# Patient Record
Sex: FEMALE | Race: WHITE | NOT HISPANIC OR LATINO | URBAN - METROPOLITAN AREA
[De-identification: names, ages, dates, MRNs, and addresses within clinical notes are randomized per-mention and may not be internally consistent; named-entity substitution may affect disease eponyms.]

---

## 2017-12-15 ENCOUNTER — OFFICE VISIT (OUTPATIENT)
Dept: ORTHOPEDIC SURGERY | Facility: CLINIC | Age: 42
End: 2017-12-15

## 2017-12-15 VITALS
WEIGHT: 165 LBS | SYSTOLIC BLOOD PRESSURE: 134 MMHG | BODY MASS INDEX: 31.15 KG/M2 | HEART RATE: 67 BPM | HEIGHT: 61 IN | DIASTOLIC BLOOD PRESSURE: 86 MMHG

## 2017-12-15 DIAGNOSIS — M76.61 RIGHT ACHILLES TENDINITIS: Primary | ICD-10-CM

## 2017-12-15 DIAGNOSIS — M72.2 PLANTAR FASCIAL FIBROMATOSIS OF LEFT FOOT: ICD-10-CM

## 2017-12-15 PROCEDURE — 99203 OFFICE O/P NEW LOW 30 MIN: CPT | Performed by: PHYSICIAN ASSISTANT

## 2017-12-15 RX ORDER — NORETHINDRONE, ETHINYL ESTRADIOL, AND FERROUS FUMARATE 0.8-25(24)
KIT ORAL
COMMUNITY
Start: 2017-10-12

## 2017-12-15 RX ORDER — TRIAMTERENE AND HYDROCHLOROTHIAZIDE 37.5; 25 MG/1; MG/1
TABLET ORAL
COMMUNITY
Start: 2017-11-17

## 2017-12-15 RX ORDER — MONTELUKAST SODIUM 10 MG/1
TABLET ORAL
Refills: 3 | COMMUNITY
Start: 2017-11-26

## 2017-12-15 RX ORDER — ESCITALOPRAM OXALATE 10 MG/1
TABLET ORAL
COMMUNITY
Start: 2017-11-17

## 2017-12-15 RX ORDER — DEXLANSOPRAZOLE 60 MG/1
CAPSULE, DELAYED RELEASE ORAL
COMMUNITY
Start: 2017-10-11

## 2017-12-15 RX ORDER — BUTALBITAL AND ACETAMINOPHEN 300; 50 MG/1; MG/1
TABLET ORAL
Refills: 2 | COMMUNITY
Start: 2017-11-09

## 2017-12-15 RX ORDER — AZITHROMYCIN 250 MG/1
TABLET, FILM COATED ORAL
Refills: 1 | COMMUNITY
Start: 2017-09-27

## 2017-12-15 NOTE — PROGRESS NOTES
[facility] Note    Subjective     Patient: Nora Becerra  : 1975    Primary Care Provider: Obed Adler MD    Requesting Provider: As above    Pain of the Left Foot and Pain of the Right Foot      History    Chief Complaint: Right achilles pain and left arch pain    History of Present Illness: This is a very pleasant 43 yo female presenting with 2 year history of right achilles tendon pain.  She denies any trauma or injury.  She complains of sharp and burning pain with weight bearing, no rest pain or night pain.  She denies any radiating pain.  She rates the pain 8/10.  She denies any warmth or erythema.  Pain is worse first thing in the morning and after getting up from a seated position.  She has treated with icing and compression sleeves and intermittent stretching.    She also complains of a small, painful nodule on the plantar aspect of her right foot.  She reports pain with pressure over the nodule.  No redness, warmth, erythema.  It is intermittently painful with weight bearing.  No rest pain or night pain.  Pain is 6/10.    No current outpatient prescriptions on file prior to visit.     No current facility-administered medications on file prior to visit.       No Known Allergies   Past Medical History:   Diagnosis Date   • Hypertension      History reviewed. No pertinent surgical history.  Family History   Problem Relation Age of Onset   • Family history unknown: Yes      Social History     Social History   • Marital status: Unknown     Spouse name: N/A   • Number of children: N/A   • Years of education: N/A     Occupational History   • Not on file.     Social History Main Topics   • Smoking status: Never Smoker   • Smokeless tobacco: Never Used   • Alcohol use No   • Drug use: Defer   • Sexual activity: Defer     Other Topics Concern   • Not on file     Social History Narrative   • No narrative on file        Review of Systems   Constitutional: Negative.    HENT:  "Negative.    Eyes: Negative.    Respiratory: Negative.    Cardiovascular: Negative.    Gastrointestinal: Negative.    Endocrine: Negative.    Genitourinary: Negative.    Musculoskeletal: Negative.         Foot Pain    Skin: Negative.    Allergic/Immunologic: Negative.    Neurological: Negative.    Hematological: Negative.    Psychiatric/Behavioral: Negative.        The following portions of the patient's history were reviewed and updated as appropriate: allergies, current medications, past family history, past medical history, past social history, past surgical history and problem list.      Objective      Physical Exam  /86  Pulse 67  Ht 154.9 cm (61\")  Wt 74.8 kg (165 lb)  BMI 31.18 kg/m2    Body mass index is 31.18 kg/(m^2).    GENERAL: Body habitus: obese    Lower extremity edema: Left: trace; Right: trace    Varicose veins:  Left: none; Right: none    Gait: normal     Mental Status:  awake and alert; oriented to person, place, and time    Voice:  clear  SKIN:  Normal    Hair Growth:  Right:normal; Left:  normal  HEENT: Head: Normocephalic, atraumatic,  without obvious abnormality.  eye: normal external eye, no icterus   PULM:  Repiratory effort normal    Ortho Exam  V:  Dorsalis Pedis:  Right: 2+; Left:2+    Posterior Tibial: Right:2+; Left:2+    Capillary Refill:  Brisk  MSK:  Hand:right handed      Tibia:  Right:  non tender; Left:  non tender      Ankle:  Right: tender over the insertion of the achilles tendon, ROM  normal and motor function  normal; Left:  non tender, ROM  normal and motor function  normal      Foot:  Right:  non tender, ROM  normal and motor function  normal; Left:  tender over plantar fibroma at mid arch, ROM  normal and motor function  normal      NEURO: Heel Walking:  Right:  normal; Left:  normal    Toe Walking:  Right:  normal; Left:  normal     [place] 5.07 monofilament test: normal    Lower extremity sensation: intact     Calf Atrophy:none    Motor Function: all " 5/5          Medical Decision Making    Data Review:   none    Assessment:  1. Right Achilles tendinitis    2. Plantar fascial fibromatosis of left foot        Plan:  1. Right insertional Achilles tendonitis, retrocalcaneal bursitis  Treated in the past with intermittent stretching, compression sleeve and ice without resolved pain.  I have explained the problem to the patient in detail.  It is generally thought to be an overuse syndrome or due to chronic aging change.  I explained that it is NOT due to a “spur”.  The osteophyte (“spur”) often visible on x-ray is not the source of the problem: Many, many people have been same x-ray finding and did not have Achilles pain.  The problem causing the pain is the chronic tendinitis, inflammation, wear and tear of the tendon where it goes into the bone.    The bump on the heel NOT go away, the goal of the treatment is to have pain resolve.    Literature shows it is best treated with a 12 week course of physical therapy and night splinting.  I have shown the patient the 3 stretches to do at home, and recommend they do them 5 reps, 6-8 times per day.  I explained that injections and orthotics will not help.  I wrote a prescription for physical therapy, and we explained where to obtain a night splint..    Follow-up in 4 months if not improved.    If not improved, I explained the next step is a short leg walking cast for 6 weeks.    Surgery is a last resort as it is only helpful in about 60% of the time in improving the pain from this problem.    2.  Plantar fibroma.  I explained to the patient that this is a benign tumor which is hereditary and most common in those of Northern  descent.  I explained that they are best treated by leaving them alone.  She can consider some orthotics with a hole cut out to relieve pressure over the fibroma.  If surgically excised, they usually return and may return larger.  Surgery can also leave a painful scar.  She will return to see us  as needed.            Beatriz De La Torre PA-C  12/15/17  2:12 PM